# Patient Record
Sex: MALE | Race: BLACK OR AFRICAN AMERICAN | NOT HISPANIC OR LATINO | Employment: UNEMPLOYED | URBAN - METROPOLITAN AREA
[De-identification: names, ages, dates, MRNs, and addresses within clinical notes are randomized per-mention and may not be internally consistent; named-entity substitution may affect disease eponyms.]

---

## 2021-05-04 ENCOUNTER — PATIENT MESSAGE (OUTPATIENT)
Dept: RESEARCH | Facility: HOSPITAL | Age: 46
End: 2021-05-04

## 2021-07-01 ENCOUNTER — PATIENT MESSAGE (OUTPATIENT)
Dept: ADMINISTRATIVE | Facility: OTHER | Age: 46
End: 2021-07-01

## 2025-02-18 ENCOUNTER — TELEPHONE (OUTPATIENT)
Dept: ORTHOPEDICS | Facility: CLINIC | Age: 50
End: 2025-02-18
Payer: MEDICAID

## 2025-02-18 NOTE — TELEPHONE ENCOUNTER
Pt wife was calling in regards to pt referral from [facility]. I explained to her that I have re faxed his referral back since December explaining that Radha can not see him since it is for back pain and Dr. Burris wanting him to see a hip surgeon since he has bone on bone but I could help him get scheduled with our hip surgeon Sohan or Tamara and our pain management doctor that we have here. She was very understanding and said they are willing to travel to see Dr. Trivedi. I offered them next available with Dr. Trivedi in [place] for April 29th and with Dr. Rudolph this Thursday. She accepted and will get his records from [facility].

## 2025-02-20 ENCOUNTER — HOSPITAL ENCOUNTER (OUTPATIENT)
Dept: RADIOLOGY | Facility: HOSPITAL | Age: 50
Discharge: HOME | End: 2025-02-20
Attending: ANESTHESIOLOGY
Payer: MEDICAID

## 2025-02-20 ENCOUNTER — OFFICE VISIT (OUTPATIENT)
Dept: PAIN MEDICINE | Facility: CLINIC | Age: 50
End: 2025-02-20
Payer: MEDICAID

## 2025-02-20 VITALS
DIASTOLIC BLOOD PRESSURE: 102 MMHG | WEIGHT: 285 LBS | SYSTOLIC BLOOD PRESSURE: 167 MMHG | HEIGHT: 75 IN | OXYGEN SATURATION: 96 % | HEART RATE: 68 BPM | BODY MASS INDEX: 35.43 KG/M2

## 2025-02-20 DIAGNOSIS — M87.051 AVASCULAR NECROSIS OF RIGHT FEMORAL HEAD: ICD-10-CM

## 2025-02-20 DIAGNOSIS — M54.16 LUMBAR RADICULITIS: Primary | ICD-10-CM

## 2025-02-20 DIAGNOSIS — M54.16 LUMBAR RADICULITIS: ICD-10-CM

## 2025-02-20 DIAGNOSIS — M48.56XS COMPRESSION FRACTURE OF LUMBAR SPINE, NON-TRAUMATIC, SEQUELA: ICD-10-CM

## 2025-02-20 DIAGNOSIS — M47.816 LUMBAR FACET ARTHROPATHY: ICD-10-CM

## 2025-02-20 DIAGNOSIS — M87.052 AVASCULAR NECROSIS OF LEFT FEMORAL HEAD: ICD-10-CM

## 2025-02-20 PROCEDURE — 72114 X-RAY EXAM L-S SPINE BENDING: CPT | Mod: TC

## 2025-02-20 PROCEDURE — 73521 X-RAY EXAM HIPS BI 2 VIEWS: CPT | Mod: TC

## 2025-02-20 PROCEDURE — 99214 OFFICE O/P EST MOD 30 MIN: CPT | Mod: PBBFAC,25 | Performed by: ANESTHESIOLOGY

## 2025-02-20 RX ORDER — TIZANIDINE 4 MG/1
4 TABLET ORAL 3 TIMES DAILY
COMMUNITY
Start: 2024-12-20

## 2025-02-20 RX ORDER — PREGABALIN 200 MG/1
200 CAPSULE ORAL 2 TIMES DAILY
COMMUNITY
Start: 2025-02-12 | End: 2025-03-14

## 2025-02-20 NOTE — PROGRESS NOTES
New Patient Evaluation  [facility]    Sadi Mathews  : 1975  Date: 2025     CHIEF COMPLAINT:  No chief complaint on file.    Referring Physician: Self, Aaareferral  Primary Care Physician: Marlyn Hall NP    HPI:  This is a 50 y.o. male with a chief complaint of No chief complaint on file.  . The patient has Past medical history/Past surgical history of ***    Patient was evaluated and referred by ***    Diabetic: {GAYes/No/NA:09486}    {Anticoagulation medications:75360}    Allergy To Iodine: {GAYes/No/NA:79823}    Currently on Antibiotic: {GAYes/No/NA:44759}    Current Description of Pain Symptoms:    History of Recent Fall or Trauma: {GAYes/No/NA:42297}   Onset: Chronic, started ***  Pain Location: ***  Radiates/associated symptoms: ***.   Pain is Getting worse over the last *** months    The pain is described as {Desc; pain character:67097}.   Exacerbating factors: {Causes; Pain:18631}.   Mitigating factors ***.   Symptoms interfere with daily activity, sleeping, and ***.   The patient feels like symptoms have been {IUW:46708}.   Patient {Denies / Reports:45170} {RED FLAGS:41659}.    Pain score:   Current: {PAIN 0-10:28365}/10  Best: {PAIN 0-10:76201}/10  Worst: {PAIN 0-10:34482}/10    Current pain medications:  Current Medications[1]    Current Narcotics/Opioid /benzo Medications:  Opioids- {GAopioid:03903}  Benzodiazepines: {GAYes/No/NA:53392}    UDS:  NA    PDMP:  {:81383}     Previous Chronic Pain Treatment History:  Six weeks of conservative therapy include: Physical Therapy/HEP/Physician Lead Exercise Program:  Over the past 12 months, Patient has done  *** sessions.  PT response: {PT response:44452} Helpful.   Dates of the PT sessions: ***, ***.  Is patient actively participating in home exercise program (HEP)/ physician led exercise program in the last 6 months: {GAYes/No/NA:87107}.    Non-interventional Pain Therapy:  []Chiropractor.   []Acupuncture/Dry  "needle.  []TENS unit.  []Heat/ICE.  []Back Brace.    Medications previously tried:  NSAIDs: {GANSIAD:20764}  Topical Agent: {GAYes/No/NA:44793}  TCA/SSRI/SNRI: {GATCA/SSRI/SNRI:54872}  Anti-convulsants: {GAAnticonvulsants:70740}  Muscle Relaxants: {GAmuscle Relaxant:99478}  Opioids- {GAopioid:56218}.    Interventional Pain Procedures:  ***    Previous spine/Relevant joint surgery:  ***  Surgical History:   has a past surgical history that includes Left knee sx and Appendectomy.  Medical History:   has a past medical history of Gout and Hypertension.  Family History:  family history includes Heart disease in his mother; Hypertension in his mother.  Allergies:  Patient has no known allergies.   Social History/SUBSTANCE ABUSE HISTORY:  Personal history of substance abuse: No   reports that he has never smoked. He has never used smokeless tobacco. He reports current alcohol use. He reports that he does not use drugs.  LABS:  CBC  Lab Results   Component Value Date    WBC 4.00 02/26/2015    HGB 15.0 02/26/2015    HCT 43.2 02/26/2015     Coagulation Profile   Lab Results   Component Value Date     02/26/2015     No results found for: "PT", "PTT", "INR"  CMP:  BMP  Lab Results   Component Value Date     09/01/2016    K 4.1 09/01/2016     09/01/2016    CO2 27 09/01/2016    BUN 11 09/01/2016    CREATININE 1.2 09/01/2016    CALCIUM 9.0 09/01/2016    ANIONGAP 7 (L) 09/01/2016     Lab Results   Component Value Date    ALT 46 (H) 09/01/2016    AST 39 09/01/2016    ALKPHOS 97 09/01/2016    BILITOT 0.9 09/01/2016     HGBA1C:  Lab Results   Component Value Date    HGBA1C 5.7 09/01/2016       ROS:    Review of Systems   GENERAL:  No weight loss, malaise or fevers.  HEENT:   No recent changes in vision or hearing  NECK:  Negative for lumps, no difficulty with swallowing.  RESPIRATORY:  Negative for cough, wheezing or shortness of breath, patient denies any recent URI.  CARDIOVASCULAR:  Negative for chest pain or " palpitations.  GI:  Negative for abdominal discomfort, blood in stools or black stools or change in bowel habits.  MUSCULOSKELETAL:  See HPI.  SKIN:  Negative for lesions, rash, and itching.  PSYCH:  No mood disorder or recent psychosocial stressors.   HEMATOLOGY/LYMPHOLOGY:  See the blood thinner sectioned in HPI.  NEURO:  See HPI  All other reviewed and negative other than HPI.    PHYSICAL EXAM:  VITALS: There were no vitals taken for this visit.  There is no height or weight on file to calculate BMI.  GENERAL: Well appearing, in no acute distress, alert and oriented x3, answers questions appropriately.   PSYCH: Flat affect.  SKIN: Skin color, texture, turgor normal, no rashes or lesions.  HEAD/FACE:  Normocephalic, atraumatic. Cranial nerves grossly intact.  CV: Regular rate  PULM: No evidence of respiratory difficulty, symmetric chest rise.  GI:  Soft and non-Distended.  NECK: ({GA+:44301}) pain to palpation over the cervical paraspinous muscles. Spurling:{GA+:68322}. ({GA+:47638}) pain with neck flexion, extension, or lateral flexion, Muscle strength in RT UE ***/5 and Left UE ***/5, Hand  ***/5, left Hand ***/5  BACK/SIJ/HIP:  Lumbar Spine Exam:       Inspection: No erythema, bruising.       Palpation: ({GA+:71043}) TTP of lumbar paraspinals bilaterally      ROM:  Limited in flexion, extension, lateral bending.       ({GA+:26105}) Facet loading {GAHip:07421}      ({GA+:49878}) Straight Leg Raise, {GAHip:99301}      ({GA+:92946}) CEASAR, Tenderness over the PSIS, Yeoman test, {GAHip:42074}  Hip Exam:      Inspection: No gross deformity or apparent leg length discrepancy      Palpation:  No TTP to bilateral greater trochanteric bursas.       ROM:  *** limitation Due to pain in internal rotation, external rotation b/l  Neurologic Exam:     Alert. Speech is fluent and appropriate.     Strength: ***/5 in {GAHip:68875} hip flexion and knee extension     Sensation:  Grossly intact to light touch in bilateral  "lower extremities     Tone: No abnormality appreciated in bilateral lower extremities  Knee exam:  No gross deformity or apparent leg length discrepancy, positive tenderness over the anteromedial aspect of the knee cap, positive limitation due to pain in flexion and extension, sensation  grossly intact to light touch in bilateral lower extremity, no atrophy or tone abnormalities    GAIT: {GAgait:59083}    DIAGNOSTIC STUDIES AND MEDICAL RECORDS REVIEW:  I have personally reviewed and interpreted relevant radiology reports and reviewed relevant records from other services in the EMR.   ***  Clinical Impression:  This is a pleasant 50 y.o. male patient with PMH/PSH of ***, presenting with***.     We discussed the underlying diagnoses and multiple treatment options including non-opioid medications, interventional procedures, physical therapy, home exercise, core muscle enhancement, and weight loss.  The risks and benefits of each treatment option were discussed and all questions were answered.      Encounter Diagnosis:  Sadi Mathews is a 50 y.o. male with the following diagnoses based on history, exam, and imaging:  There are no diagnoses linked to this encounter.     Treatment Plan:    Diagnostics/Referrals: {gaimage:60855}    Medications:    NSAIDs: {GANSIAD:91890}  Topical Agent: {GAYes/No/NA:15509}  TCA/SSRI/SNRI: {GATCA/SSRI/SNRI:54508}  Anti-convulsants: {GAAnticonvulsants:42585}  Muscle Relaxants: {GAmuscle Relaxant:46374}  Opioids: {GAopioid:56722::"None"}  Patient was educated about the risk and benefit of chronic opioid therapy including dependency, addiction, diversion, and opioid hyperalgesia.  Interventional Therapy: {GAProcedure:76968}.  Sedation: {GAsedation:79652}.  {Anticoagulation medications:60385} -Clearance to stop Blood thinner: {GAYes/No/NA:32361}    Regarding the above interventions, the patient has been educated regarding the risks (including bleeding, infection, increased pain, nerve damage, " or allergic reaction), benefits, and alternatives. The patient states he understands and is eager to proceed.    Physical Rehabilitation: {GAPT:34489}    Patient Education: Counseled patient regarding the importance of {:84969}, I have stressed the importance of physical activity and a home exercise plan to help with pain and improve health.    Follow-up: RTC ***.    May consider:     I would like to thank Self, Aaareferral for the opportunity to assist in the care of this patient. We had a very nice visit and I look forward to continuing their care. Please let me know if I can be of further assistance.     Malgorzata Rudolph MD  Anesthesiologist  Interventional Pain Medicine  02/20/2025    Disclaimer:  This note was prepared using voice recognition system and is likely to have sound alike errors that may have been overlooked even after proof reading.  Please call me with any questions.         [1]    Current Outpatient Medications:     allopurinol (ZYLOPRIM) 300 MG tablet, Take 1 tablet (300 mg total) by mouth once daily. (Patient taking differently: Take 100 mg by mouth once daily.), Disp: 30 tablet, Rfl: 11    diclofenac (VOLTAREN) 75 MG EC tablet, Take 75 mg by mouth 2 (two) times daily., Disp: , Rfl:     HYDROcodone-acetaminophen (NORCO) 5-325 mg per tablet, Take 1 tablet by mouth every 8 (eight) hours as needed for Pain. (Patient not taking: Reported on 5/20/2024), Disp: 12 tablet, Rfl: 0    indomethacin (INDOCIN) 25 MG capsule, Take 1 capsule (25 mg total) by mouth 3 (three) times daily as needed., Disp: 30 capsule, Rfl: 1    lisinopril 10 MG tablet, Take 1 tablet (10 mg total) by mouth once daily., Disp: 30 tablet, Rfl: 11    methocarbamoL (ROBAXIN) 750 MG Tab, Take 750 mg by mouth 2 (two) times a day., Disp: , Rfl:     naproxen (NAPROSYN) 500 MG tablet, Take 1 tablet (500 mg total) by mouth every 12 (twelve) hours as needed (pain)., Disp: 30 tablet, Rfl: 0

## 2025-02-20 NOTE — PROGRESS NOTES
New Patient Evaluation  [facility]    Sadi Mathews  : 1975  Date: 2025     CHIEF COMPLAINT:  Low-back Pain, Leg Pain, Hip Pain, and Headache    [facility] Physician: [facility]  Primary Care Physician: Marlyn Hall NP    HPI:  This is a 50 y.o. male with a chief complaint of Low-back Pain, Leg Pain, Hip Pain, and Headache  . The patient has Past medical history/Past surgical history of  gout, hypertension    Patient was  self-referred for low back pain, bilateral hip pain and bilateral lower extremity radiation   Patient has completed MRI lumbar spine outside [facility] in addition to EMG/nerve conduction study  He is scheduled to see orthopedic provider for bilateral hip avascular necrosis    Diabetic: No    Anticoagulation medications: None    Allergy To Iodine: No    Currently on Antibiotic: No    Current Description of Pain Symptoms:    History of Recent Fall or Trauma: Yes 2025  Onset: Chronic, started About 1.5 years ago  Pain Location: lower back, Bl Hips  Radiates/associated symptoms: BL LE RT>LT to the feet.   Pain is Getting worse over the last 3 months    The pain is described as aching, burning, numbing, sharp, shooting, stabbing, throbbing, and tingling.   Exacerbating factors: Sitting, Standing, Laying, Walking, Night Time, Morning, and Lifting.   Mitigating factors moving.   Symptoms interfere with daily activity, sleeping.   The patient feels like symptoms have been worsening.   Patient denies night fever/night sweats, urinary incontinence, bowel incontinence, significant weight loss, and loss of sensations.  BLE weakness, legs give out, he used cane all the time.    Pain score:   Current: 8/10  Best: 8/10  Worst: 8/10    Current pain medications:  Tizanidine 4 mg, PRN  Lyrica 200mg, BID  Naproxen 500 mg, PRN  Diclofenac 75 mg, BID  [id number] mg, BID    Current Narcotics/Opioid /benzo Medications:  Opioids- None  Benzodiazepines:  "No    UDS:  NA    PDMP:  Reviewed and consistent with medication use as prescribed.     Previous Chronic Pain Treatment History:  Six weeks of conservative therapy include: Physical Therapy/HEP/Physician Lead Exercise Program:  Over the past 12 months, Patient has done  0 sessions.    Is patient actively participating in home exercise program (HEP)/ physician led exercise program in the last 6 months: Yes.    Non-interventional Pain Therapy:  []Chiropractor.   []Acupuncture/Dry needle.  []TENS unit.  [x]Heat/ICE.  []Back Brace.    Medications previously tried:  NSAIDs: Ibuprofen (Advil/Motrin), Naproxen (Naprosyn), and Diclofenac  Topical Agent: Yes  TCA/SSRI/SNRI: None  Anti-convulsants: Lyrica   Muscle Relaxants: Robaxin (methocarbamol )  and Tizanidine (Zanaflex)  Opioids- Oxycodone with Acetaminophen (Percocet) and Hydrocodone with Acetaminophen (Norco).    Interventional Pain Procedures:  N/A    Previous spine/Relevant joint surgery:  N/A  Surgical History:   has a past surgical history that includes Left knee sx and Appendectomy.  Medical History:   has a past medical history of Gout and Hypertension.  Family History:  family history includes Heart disease in his mother; Hypertension in his mother.  Allergies:  Patient has no known allergies.   Social History/SUBSTANCE ABUSE HISTORY:  Personal history of substance abuse: No   reports that he has never smoked. He has never used smokeless tobacco. He reports current alcohol use. He reports that he does not use drugs.  LABS:  CBC  Lab Results   Component Value Date    WBC 4.00 02/26/2015    HGB 15.0 02/26/2015    HCT 43.2 02/26/2015     Coagulation Profile   Lab Results   Component Value Date     02/26/2015     No results found for: "PT", "PTT", "INR"  CMP:  BMP  Lab Results   Component Value Date     09/01/2016    K 4.1 09/01/2016     09/01/2016    CO2 27 09/01/2016    BUN 11 09/01/2016    CREATININE 1.2 09/01/2016    CALCIUM 9.0 09/01/2016    " "ANIONGAP 7 (L) 09/01/2016     Lab Results   Component Value Date    ALT 46 (H) 09/01/2016    AST 39 09/01/2016    ALKPHOS 97 09/01/2016    BILITOT 0.9 09/01/2016     HGBA1C:  Lab Results   Component Value Date    HGBA1C 5.7 09/01/2016       ROS:    Review of Systems   GENERAL:  No weight loss, malaise or fevers.  HEENT:   No recent changes in vision or hearing  NECK:  Negative for lumps, no difficulty with swallowing.  RESPIRATORY:  Negative for cough, wheezing or shortness of breath, patient denies any recent URI.  CARDIOVASCULAR:  Negative for chest pain or palpitations.  GI:  Negative for abdominal discomfort, blood in stools or black stools or change in bowel habits.  MUSCULOSKELETAL:  See HPI.  SKIN:  Negative for lesions, rash, and itching.  PSYCH:  No mood disorder or recent psychosocial stressors.   HEMATOLOGY/LYMPHOLOGY:  See the blood thinner sectioned in HPI.  NEURO:  See HPI  All other reviewed and negative other than HPI.    PHYSICAL EXAM:  VITALS: BP (!) 167/102 (BP Location: Left arm, Patient Position: Sitting)   Pulse 68   Ht 6' 3" (1.905 m)   Wt 129.3 kg (285 lb)   SpO2 96%   BMI 35.62 kg/m²   Body mass index is 35.62 kg/m².  GENERAL: Well appearing, in no acute distress, alert and oriented x3, answers questions appropriately.   PSYCH: Flat affect.  SKIN: Skin color, texture, turgor normal, no rashes or lesions.  HEAD/FACE:  Normocephalic, atraumatic. Cranial nerves grossly intact.  CV: Regular rate  PULM: No evidence of respiratory difficulty, symmetric chest rise.  GI:  Soft and non-Distended.    BACK/SIJ/HIP:  Lumbar Spine Exam:       Inspection: No erythema, bruising.       Palpation: (+++) TTP of lumbar paraspinals bilaterally      ROM:  Limited in flexion, extension, lateral bending.       (+++) Facet loading bilateral      (NA) Straight Leg Raise      (NA) CEASAR, Tenderness over the PSIS, Yeoman test  Hip Exam:      Inspection: No gross deformity or apparent leg length discrepancy      " Palpation:  No TTP to bilateral greater trochanteric bursas.       ROM:  + limitation Due to pain in internal rotation, external rotation b/l  Neurologic Exam:     Alert. Speech is fluent and appropriate.     Strength: 3/5 in left hip flexion and knee extension     Sensation:  Grossly intact to light touch in bilateral lower extremities     Tone: No abnormality appreciated in bilateral lower extremities    GAIT: walking with the assistance using cane    DIAGNOSTIC STUDIES AND MEDICAL RECORDS REVIEW:  I have personally reviewed and interpreted relevant radiology reports and reviewed relevant records from other services in the EMR.       -  x-ray lumbar spine 2025  Alignment: Mild levo scoliotic curvature.  Mild retrolisthesis of L1 on L2, L2 on L3 and L3 on L4. There does appear to be some mild movement at these levels on flexion and extension imaging concerning for ligamentous instability.     Vertebrae: There is some chronic compression deformity of the L3, L4 and L5 vertebra, unchanged from 2022. No suspicious appearing lytic or blastic lesions.     Discs and facets: There is disc space narrowing with endplate sclerosis at the L3-4 and L4-5 levels.  Facet arthropathy at L4-5 and L5-S1.    Clinical Impression:  This is a pleasant 50 y.o. male patient with PMH/PSH of gout, hypertension, presenting with low back pain, bilateral hip pain, bilateral lower extremity radiation, bilateral lower extremity weakness, patient is very antalgic with a unsteady gait, he is using the cane all the time, he is pending to be seen by orthopedic team for avascular necrosis of bilateral femoral head.     Encounter Diagnosis:  Sadi Mathews is a 50 y.o. male with the following diagnoses based on history, exam, and imagin. Lumbar radiculitis  - X-Ray Lumbar Complete Including Flex And Ext; Future  - X-Ray Hips Bilateral 2 View Inc AP Pelvis; Future    2. Lumbar facet arthropathy    3. Avascular necrosis of left femoral  head    4. Avascular necrosis of right femoral head    5. Compression fracture of lumbar spine, non-traumatic, sequela     Treatment Plan:    Diagnostics/Referrals: pending receiving records for SNC: MRI lumbar spine and EMG    Medications:    Tizanidine 4 mg, PRN  Lyrica 200mg, BID  Naproxen 500 mg, PRN  Diclofenac 75 mg, BID  [id number] mg, BID    Interventional Therapy: Procedure based on MRI images.  Sedation: NA.  Anticoagulation medications: None -Clearance to stop Blood thinner: NA      Physical Rehabilitation:  continue with a home exercise    Follow-up: RTC  to discuss MRI lumbar spine.    May consider: BL HIP injection     Malgorzata Rudolph MD  Anesthesiologist  Interventional Pain Medicine  02/20/2025    Disclaimer:  This note was prepared using voice recognition system and is likely to have sound alike errors that may have been overlooked even after proof reading.  Please call me with any questions.

## 2025-04-29 ENCOUNTER — TELEPHONE (OUTPATIENT)
Dept: ORTHOPEDICS | Facility: CLINIC | Age: 50
End: 2025-04-29

## 2025-04-29 ENCOUNTER — OFFICE VISIT (OUTPATIENT)
Dept: ORTHOPEDICS | Facility: CLINIC | Age: 50
End: 2025-04-29
Payer: MEDICAID

## 2025-04-29 VITALS — BODY MASS INDEX: 35.62 KG/M2 | HEIGHT: 75 IN

## 2025-04-29 DIAGNOSIS — M87.052 AVASCULAR NECROSIS OF BONES OF BOTH HIPS: Primary | ICD-10-CM

## 2025-04-29 DIAGNOSIS — Z96.642 S/P TOTAL LEFT HIP ARTHROPLASTY: Primary | ICD-10-CM

## 2025-04-29 DIAGNOSIS — M87.051 AVASCULAR NECROSIS OF BONES OF BOTH HIPS: Primary | ICD-10-CM

## 2025-04-29 PROCEDURE — 99999 PR PBB SHADOW E&M-EST. PATIENT-LVL III: CPT | Mod: PBBFAC,,, | Performed by: ORTHOPAEDIC SURGERY

## 2025-04-29 PROCEDURE — 1160F RVW MEDS BY RX/DR IN RCRD: CPT | Mod: CPTII,,, | Performed by: ORTHOPAEDIC SURGERY

## 2025-04-29 PROCEDURE — 99204 OFFICE O/P NEW MOD 45 MIN: CPT | Mod: S$PBB,,, | Performed by: ORTHOPAEDIC SURGERY

## 2025-04-29 PROCEDURE — 99213 OFFICE O/P EST LOW 20 MIN: CPT | Mod: PBBFAC,PN | Performed by: ORTHOPAEDIC SURGERY

## 2025-04-29 PROCEDURE — 1159F MED LIST DOCD IN RCRD: CPT | Mod: CPTII,,, | Performed by: ORTHOPAEDIC SURGERY

## 2025-04-29 PROCEDURE — 3008F BODY MASS INDEX DOCD: CPT | Mod: CPTII,,, | Performed by: ORTHOPAEDIC SURGERY

## 2025-04-29 NOTE — PROGRESS NOTES
Subjective:      Patient ID: Sadi Mathews is a 50 y.o. male.    Chief Complaint: Consult (Hip pain )    HPI      Sadi Mathews is seen for evaluation and treatment of hip pain.  They have experienced problems with their bilateral hip over the past about 1 year Pain is located in the groin  and referred to the knee. They have been treated with  Norco, NSAIDS, cane/walker, and activity modification. Symptoms have recently worsened. Ambulation reportedly has been impaired. Self care ADLs are painful.  The patient reports that there is alternation between each side with regards to which 1 feels worse.    The patient has a history of intermittent use of prednisone gout and he reports regular alcohol consumption.    A documented history of lumbosacral degenerative disease is also noted    Review of Systems   Constitutional: Negative for fever and weight loss.   HENT:  Negative for congestion.    Eyes:  Negative for visual disturbance.   Cardiovascular:  Negative for chest pain.   Respiratory:  Negative for shortness of breath.    Hematologic/Lymphatic: Negative for bleeding problem. Does not bruise/bleed easily.   Skin:  Negative for poor wound healing.   Musculoskeletal:  Positive for back pain and joint pain.   Gastrointestinal:  Negative for abdominal pain.   Genitourinary:  Negative for dysuria.   Neurological:  Negative for seizures.   Psychiatric/Behavioral:  Negative for altered mental status.    Allergic/Immunologic: Negative for persistent infections.         Objective:            Ortho/SPM Exam      Left hip    The patient is not in acute distress.   Body habitus is:normal.   Sclerae normal  The patient walks with a limp.   Respiratory distress:  none  The skin over the hip is:intact.   There is:no local tenderness.   Range of motion- Flexion 90, External rotation 30, internal rotation 10.  Resisted SLR positive.  Pain with rotation positive  Sciatic tension findings negative.  Shortening/lengthening  compared to the contralateral side exam deferred.  Pulses DP present, PT present.  Motor normal 5/5 strength in all tested muscle groups.   Sensory normal.    Right hip     Slightly arrange of motion all planes, otherwise identical    IMAGING- radiographs the left hip show avascular necrosis with collapse and degeneration, Grace stage 5.  Radiographs the right hip show avascular necrosis with a crescent sign and likely early collapse, Grace stage IV              Assessment:       Encounter Diagnosis   Name Primary?    Avascular necrosis of bones of both hips Yes            Although the patient has fairly symmetrical symptomatology, the left side is structurally much worse.  The patient has significant pain and functional impairment bilaterally.  Both sides have progression to the point where the only intervention likely to be of meaningful benefit is arthroplasty.        Plan:       Sadi was seen today for consult.    Diagnoses and all orders for this visit:    Avascular necrosis of bones of both hips          I explained my diagnostic impression and the reasoning behind it in detail, using layman's terms.  Models and/or pictures were used to help in the explanation.    The process of left total hip replacement was explained to the patient.  The nature of the procedure was explained using a model.  The expected perioperative clinical course and period of recovery as applicable to the patient's condition was described.  The risks including death, infection, thromboembolic events, instability, leg length discrepancy, persistent pain/stiffness, fracture around implant and implant failure due to wear or loosening, with possible need for reoperation, were all explained.  The possibility and expectations of continued nonsurgical care were reviewed.  The patient understands and wishes to proceed with the recommended operation.    The right side can be addressed after the patient feels sufficiently recovered from  the left.    In the future, ongoing management of gout and lumbar degenerative problems will be needed in order to maximize the patient has functional capacity and comfort.  I explained this.

## 2025-05-14 ENCOUNTER — TELEPHONE (OUTPATIENT)
Dept: ORTHOPEDICS | Facility: CLINIC | Age: 50
End: 2025-05-14
Payer: MEDICAID

## 2025-05-14 DIAGNOSIS — M87.052 AVASCULAR NECROSIS OF BONE OF LEFT HIP: Primary | ICD-10-CM

## 2025-05-14 DIAGNOSIS — Z01.818 PRE-OP EXAM: ICD-10-CM

## 2025-05-19 ENCOUNTER — OFFICE VISIT (OUTPATIENT)
Dept: ORTHOPEDICS | Facility: CLINIC | Age: 50
End: 2025-05-19
Payer: MEDICAID

## 2025-05-19 DIAGNOSIS — M87.052 AVASCULAR NECROSIS OF BONE OF LEFT HIP: Primary | ICD-10-CM

## 2025-05-19 DIAGNOSIS — Z96.642 S/P TOTAL LEFT HIP ARTHROPLASTY: ICD-10-CM

## 2025-05-19 DIAGNOSIS — Z01.818 PRE-OP EXAM: ICD-10-CM

## 2025-05-19 PROCEDURE — 99213 OFFICE O/P EST LOW 20 MIN: CPT | Mod: PBBFAC,PN

## 2025-05-19 PROCEDURE — 1159F MED LIST DOCD IN RCRD: CPT | Mod: CPTII,,,

## 2025-05-19 PROCEDURE — 3044F HG A1C LEVEL LT 7.0%: CPT | Mod: CPTII,,,

## 2025-05-19 PROCEDURE — 1160F RVW MEDS BY RX/DR IN RCRD: CPT | Mod: CPTII,,,

## 2025-05-19 PROCEDURE — 99999 PR PBB SHADOW E&M-EST. PATIENT-LVL III: CPT | Mod: PBBFAC,,,

## 2025-05-19 PROCEDURE — 99214 OFFICE O/P EST MOD 30 MIN: CPT | Mod: S$PBB,,,

## 2025-05-19 NOTE — PROGRESS NOTES
Subjective:      Patient ID: Sadi Mathews is a 50 y.o. male.    Chief Complaint: Left hip pain     HPI: Sadi Mathews is a 50 y.o. male who presents to the clinic today for a patient optimization visit in preparation for a left total hip arthroplasty to be performed by Dr. Trivedi on 06/02/2025.  He has a significant history of left hip pain.  Pain is worse with activity and weight bearing.  Patient has experienced interference of ADLs due to increased pain and decreased range of motion. Patient has failed non-operative treatment including NSAIDs, activity modification, weight loss, and corticosteroid injections. Sadi Mathews ambulates with a cane. He was last seen and treated in the clinic on 4/29/2025. There has been no significant change in medical status since last visit.    Ambulating: with a cane  Diabetic:  No  Smoking:  He has never smoked.  History of DVT/PE: Negative      PMHx significant for:         - Gout        - Hypertension           PAST MEDICAL HISTORY:    Past Medical History:   Diagnosis Date    Gout     Hypertension      PAST SURGICAL HISTORY:    Past Surgical History:   Procedure Laterality Date    APPENDECTOMY      Left knee sx       FAMILY HISTORY:    Family History   Problem Relation Name Age of Onset    Heart disease Mother      Hypertension Mother       SOCIAL HISTORY:    Social History     Occupational History    Not on file   Tobacco Use    Smoking status: Never    Smokeless tobacco: Never   Substance and Sexual Activity    Alcohol use: Yes     Comment: occ    Drug use: No    Sexual activity: Not on file      MEDICATIONS: Current Medications[1]  ALLERGIES: Review of patient's allergies indicates:  No Known Allergies    Review of Systems:  Constitution: Negative for chills, fever and night sweats.   HENT: Negative for congestion and headaches.    Eyes: Negative for blurred vision or vision loss.  Cardiovascular: Negative for chest pain and syncope.   Respiratory: Negative for cough  and shortness of breath.    Endocrine: Negative for polydipsia, polyphagia and polyuria.   Hematologic/Lymphatic: Negative for bleeding problem. Does not bruise/bleed easily.   Skin: Negative for dry skin, itching and rash.   Musculoskeletal: See HPI.   Gastrointestinal: Negative for abdominal pain and bowel incontinence.   Genitourinary: Negative for bladder incontinence and nocturia.   Neurological: Negative for disturbances in coordination, loss of balance and seizures.   Psychiatric/Behavioral: Negative for depression. The patient does not have insomnia.    Allergic/Immunologic: Negative for hives and persistent infections.        Objective:      There were no vitals filed for this visit.    PHYSICAL EXAM:  General: Alert & oriented x3, well-developed and well-nourished, in no acute distress, sitting comfortably in the exam room.  Skin: Warm and dry. Capillary refill less than 2 seconds.   Head: Normocephalic and atraumatic.   Eyes: Sclera appear normal.   Nose: No deformities seen.   Ears: No deformities seen.   Neck: No tracheal deviation present.   Pulmonary/Chest: Breathing unlabored. Breath sounds normal.  Cardiovascular: Normal rate and regular rhythm.   Abdominal: Soft and nondistended.  Neurological: Alert and oriented to person, place, and time.   Psychiatric: Mood is pleasant and affect appropriate.     LEFT HIP:        Body habitus is:  normal.         The patient walks with a limp.         Resisted SLR:  positive.        Sciatic tension findings:  negative.        The skin over the hip is intact.        Tenderness:  no local tenderness.        Range of Motion:    Flexion:  90°  External Rotation:  30°  Internal Rotation:  10°        Pain with rotation:  positive        Shortening/lengthening compared to the contralateral side:  exam deferred.        Pulses DP present, PT present.        Motor:  normal 5/5 strength in all tested muscle groups.         Sensory:  normal.      Lab Review:         CBC  from 05/14/2025 reviewed: stable        CMP from 05/14/2025 reviewed: stable        Hemoglobin A1C from 05/14/2025 reviewed: 5.3        Imaging:         Dr. Trivedi previous imaging read: radiographs the left hip show avascular necrosis with collapse and degeneration, Grace stage 5.  Radiographs the right hip show avascular necrosis with a crescent sign and likely early collapse, Grace stage IV        Assessment:       1. Avascular necrosis of bone of left hip    2. Pre-op exam    3. S/P total left hip arthroplasty         Plan:       Orders Placed This Encounter    WALKER FOR HOME USE    Ambulatory referral/consult to Home Health       Left total hip arthroplasty scheduled for  06/02/2025.    Discharge planning was discussed and patient plans to go home the same day of surgery if possible.    Post-op Home Health and DME has been ordered including standard walker.     During today's Patient Optimization Visit all consultant notes, medications, imaging, EKG, and lab work has been reviewed. Discharge planning was discussed and Home Health has been ordered. Any additional testing or consults needed for medical clearance has been ordered. Pre, sarah, and post operative procedures and expectations discussed. All questions were answered.   Sadi Mathews will contact us if there are any questions, concerns, or changes in medical status prior to surgery.    30 minutes were spent on this encounter. This includes face to face time and non-face to face time preparing to see the patient (eg, review of tests), obtaining and/or reviewing separately obtained history, documenting clinical information in the electronic or other health record, independently interpreting results and communicating results to the patient/family/caregiver, or care coordinator.     Walker Justification: The mobility limitation cannot be sufficiently resolved by the use of a cane.   Patient's functional mobility deficit can be sufficiently resolved  with the use of a (Rolling Walker or Walker).  Patient's mobility limitation significantly impairs their ability to participate in one of more activities of daily living.  The use of a (RW or Walker) will significantly improve the patient's ability to participate in MRADLS and the patient will use it on regular basis in the home.     All of the patient's questions were answered and the patient will contact us if they have any questions or concerns in the interim.      Kat Bradford PA-C  [facility]  Orthopedic Surgery    Medical Dictation software was used during the dictation of portions or the entirety of this medical record.  Phonetic or grammatic errors may exist due to the use of this software. For clarification, refer to the author of the document.              [1]   Current Outpatient Medications:     allopurinol (ZYLOPRIM) 300 MG tablet, Take 1 tablet (300 mg total) by mouth once daily. (Patient not taking: Reported on 5/19/2025), Disp: 30 tablet, Rfl: 11    HYDROcodone-acetaminophen (NORCO) 5-325 mg per tablet, Take 1 tablet by mouth every 8 (eight) hours as needed for Pain. (Patient not taking: Reported on 5/20/2024), Disp: 12 tablet, Rfl: 0    indomethacin (INDOCIN) 25 MG capsule, Take 1 capsule (25 mg total) by mouth 3 (three) times daily as needed. (Patient not taking: Reported on 5/19/2025), Disp: 30 capsule, Rfl: 1    lisinopril 10 MG tablet, Take 1 tablet (10 mg total) by mouth once daily., Disp: 30 tablet, Rfl: 11    LYRICA 200 mg Cap, Take 200 mg by mouth 2 (two) times daily., Disp: , Rfl:     meloxicam (MOBIC) 15 MG tablet, Take 1 tablet (15 mg total) by mouth once daily. (Patient not taking: Reported on 5/19/2025), Disp: 30 tablet, Rfl: 1    methocarbamoL (ROBAXIN) 750 MG Tab, Take 750 mg by mouth 2 (two) times a day. (Patient not taking: Reported on 5/19/2025), Disp: , Rfl:     naproxen (NAPROSYN) 500 MG tablet, Take 1 tablet (500 mg total) by mouth every 12 (twelve) hours as needed  (pain). (Patient not taking: Reported on 5/19/2025), Disp: 30 tablet, Rfl: 0    tiZANidine (ZANAFLEX) 4 MG tablet, Take 4 mg by mouth 3 (three) times daily. (Patient not taking: Reported on 5/19/2025), Disp: , Rfl:

## 2025-06-02 PROBLEM — Z96.642 HISTORY OF LEFT HIP REPLACEMENT: Status: ACTIVE | Noted: 2025-06-02

## 2025-06-16 ENCOUNTER — OFFICE VISIT (OUTPATIENT)
Dept: ORTHOPEDICS | Facility: CLINIC | Age: 50
End: 2025-06-16
Payer: MEDICAID

## 2025-06-16 DIAGNOSIS — Z96.642 S/P TOTAL LEFT HIP ARTHROPLASTY: Primary | ICD-10-CM

## 2025-06-16 PROCEDURE — 99213 OFFICE O/P EST LOW 20 MIN: CPT | Mod: PBBFAC,PN

## 2025-06-16 PROCEDURE — 99024 POSTOP FOLLOW-UP VISIT: CPT | Mod: ,,,

## 2025-06-16 PROCEDURE — 1160F RVW MEDS BY RX/DR IN RCRD: CPT | Mod: CPTII,,,

## 2025-06-16 PROCEDURE — 3044F HG A1C LEVEL LT 7.0%: CPT | Mod: CPTII,,,

## 2025-06-16 PROCEDURE — 99999 PR PBB SHADOW E&M-EST. PATIENT-LVL III: CPT | Mod: PBBFAC,,,

## 2025-06-16 PROCEDURE — 1159F MED LIST DOCD IN RCRD: CPT | Mod: CPTII,,,

## 2025-06-16 NOTE — PROGRESS NOTES
Subjective:      Patient ID: Sadi Mathews is a 50 y.o. male.    Chief Complaint: Post-op Follow Up    HPI: Sadi Mathews returns for a 2 week post-op visit following: left total hip arthroplasty (date of surgery 06/02/2025) with Dr. Trivedi. Overall, the patient is doing well with no complaints of fever, chills, nausea, vomiting, SOB, chest pains, or drainage to surgical incision. The patient reports that pain has been managed with medication. He has been working with Home Health physical therapy, and has been progressing with this. The patient reports ambulating easily. Post-operative complaints include: none.       PAST MEDICAL HISTORY:    Past Medical History:   Diagnosis Date    Avascular necrosis of bone of hip, left     Gout     Hypertension     states has not taken lisinopril in years; unsure if ever officially diagnosed w/; no mention of by pcp     MEDICATIONS: Current Medications[1]  ALLERGIES: Review of patient's allergies indicates:  No Known Allergies    Review of Systems:  Constitution: Negative for chills, fever and night sweats.   HENT: Negative for congestion and headaches.    Eyes: Negative for blurred vision or vision loss.  Cardiovascular: Negative for chest pain and syncope.   Respiratory: Negative for cough and shortness of breath.    Endocrine: Negative for polydipsia, polyphagia and polyuria.   Hematologic/Lymphatic: Negative for bleeding problem. Does not bruise/bleed easily.   Skin: Negative for dry skin, itching and rash.   Musculoskeletal: See HPI.   Gastrointestinal: Negative for abdominal pain and bowel incontinence.   Genitourinary: Negative for bladder incontinence and nocturia.   Neurological: Negative for disturbances in coordination, loss of balance and seizures.   Psychiatric/Behavioral: Negative for depression. The patient does not have insomnia.    Allergic/Immunologic: Negative for hives and persistent infections.        Objective:      There were no vitals filed for this  visit.    PHYSICAL EXAM:  General: Alert & oriented x3, well-developed and well-nourished, in no acute distress, sitting comfortably in the exam room.  Skin: Warm and dry. Capillary refill less than 2 seconds.   Head: Normocephalic and atraumatic.   Eyes: Sclera appear normal.   Nose: No deformities seen.   Ears: No deformities seen.   Neck: No tracheal deviation present.   Pulmonary/Chest: Breathing unlabored.   Neurological: Alert and oriented to person, place, and time.   Psychiatric: Mood is pleasant and affect appropriate.       LEFT HIP:        Observation/Inspection:    Surgical incision is well healed with appropriate scar formation.  No redness, warmth, drainage, or other signs of infection.  Mild swelling.         Range of Motion:  painless         Palpation: No tenderness to palpation to bony prominences and soft tissues throughout.         Strength:    Strength not tested today.  Motor:  no muscle wasting or atrophy.         Neurovascular Exam:  Pulses DP present, PT present.  Sensory:  normal.        Imaging:    X-Rays: 3 views of left hip obtained in the Orthopedic clinic today, and independently reviewed, show: Well-positioned, well-fixed left total hip arthroplasty implant without evidence of loosening or complicating process. No acute fracture or dislocation.       Assessment:       1. S/P total left hip arthroplasty        Plan:          2 weeks s/p left total hip arthroplasty    Overall patient is doing well post-operatively.    Aquacel bandage removed today without complication. Incisions are well healed, with no drainage, erythema, or signs of infection.     Wound Care:  Regular wound care explained to the patient.  Okay to wash incision with soap and water and pat to dry.   Medications:    Continue meloxicam as needed for pain.   Continue with OTC Tylenol and/or NSAIDs as needed for pain.   Antibiotics:  None prescribed today.  No evidence of wound infection.  Physical Therapy:  Patient is  ambulating great, do not see a need for referral to formal PT. Continue HEP.  DME:  Okay to begin to wean off of walker.   Activity:  Continue to advance activities as tolerated.  Continue posterior hip precautions.    Pain Management: Ice compress to the affected area 2-3x a day for 15-20 minutes as needed for pain management.      Follow-Up: 4 weeks for 6-week post-op visit.      All of the patient's questions were answered and the patient will contact us if they have any questions or concerns in the interim.        Kat Bradford PA-C  [facility]  Orthopedic Surgery    Medical Dictation software was used during the dictation of portions or the entirety of this medical record.  Phonetic or grammatic errors may exist due to the use of this software. For clarification, refer to the author of the document.             [1]   Current Outpatient Medications:     allopurinol (ZYLOPRIM) 300 MG tablet, Take 1 tablet (300 mg total) by mouth once daily. (Patient not taking: Reported on 5/19/2025), Disp: 30 tablet, Rfl: 11    aspirin (ECOTRIN) 81 MG EC tablet, Take 1 tablet (81 mg total) by mouth once daily., Disp: , Rfl:     LYRICA 200 mg Cap, Take 200 mg by mouth 2 (two) times daily. Not currently taking, Disp: , Rfl:     meloxicam (MOBIC) 15 MG tablet, Take 1 tablet (15 mg total) by mouth once daily. (Patient not taking: Reported on 5/19/2025), Disp: 30 tablet, Rfl: 1    oxyCODONE-acetaminophen (PERCOCET) 7.5-325 mg per tablet, Take 1 tablet by mouth every 6 (six) hours as needed for Pain., Disp: 45 tablet, Rfl: 0    tiZANidine (ZANAFLEX) 4 MG tablet, Take 4 mg by mouth 3 (three) times daily. (Patient not taking: Reported on 5/19/2025), Disp: , Rfl:

## 2025-06-19 DIAGNOSIS — Z96.642 S/P TOTAL LEFT HIP ARTHROPLASTY: Primary | ICD-10-CM

## 2025-06-26 ENCOUNTER — TELEPHONE (OUTPATIENT)
Dept: ORTHOPEDICS | Facility: CLINIC | Age: 50
End: 2025-06-26
Payer: MEDICAID

## 2025-06-26 NOTE — TELEPHONE ENCOUNTER
Spoke with Mr. Mathews. His PT referral was sent but PT informed him they do not take his insurance. Pt was advised to let the office know which company he would like to go forward with. Pt stated he will call back and let us know where to fax the current PT order.

## 2025-07-15 ENCOUNTER — OFFICE VISIT (OUTPATIENT)
Dept: ORTHOPEDICS | Facility: CLINIC | Age: 50
End: 2025-07-15
Payer: MEDICAID

## 2025-07-15 DIAGNOSIS — Z96.642 S/P TOTAL LEFT HIP ARTHROPLASTY: ICD-10-CM

## 2025-07-15 DIAGNOSIS — M87.051 AVASCULAR NECROSIS OF BONES OF BOTH HIPS: Primary | ICD-10-CM

## 2025-07-15 DIAGNOSIS — M87.052 AVASCULAR NECROSIS OF BONES OF BOTH HIPS: Primary | ICD-10-CM

## 2025-07-15 PROCEDURE — 3044F HG A1C LEVEL LT 7.0%: CPT | Mod: CPTII,,, | Performed by: ORTHOPAEDIC SURGERY

## 2025-07-15 PROCEDURE — 99024 POSTOP FOLLOW-UP VISIT: CPT | Mod: ,,, | Performed by: ORTHOPAEDIC SURGERY

## 2025-07-15 PROCEDURE — 99999 PR PBB SHADOW E&M-EST. PATIENT-LVL II: CPT | Mod: PBBFAC,,, | Performed by: ORTHOPAEDIC SURGERY

## 2025-07-15 PROCEDURE — 1159F MED LIST DOCD IN RCRD: CPT | Mod: CPTII,,, | Performed by: ORTHOPAEDIC SURGERY

## 2025-07-15 PROCEDURE — 99212 OFFICE O/P EST SF 10 MIN: CPT | Mod: PBBFAC,PN | Performed by: ORTHOPAEDIC SURGERY

## 2025-07-15 PROCEDURE — 1160F RVW MEDS BY RX/DR IN RCRD: CPT | Mod: CPTII,,, | Performed by: ORTHOPAEDIC SURGERY

## 2025-07-15 NOTE — PROGRESS NOTES
Subjective:      Patient ID: Sadi Mathews is a 50 y.o. male.    Chief Complaint: Post-op Evaluation (6 week p/o - left DARIAN )      HPI:  Six weeks postop  The patient is seen for postop follow-up of left  DARIAN.  Pain control has been satisfactory  They feel that they are ambulating easily  Postoperative complaints include:  None at this time    Current Medications[1]  Review of patient's allergies indicates:  No Known Allergies    There were no vitals taken for this visit.    ROS        Objective:    Ortho Exam          Alert, oriented, no acute distress  Sclera-Normal  Respiratory distress-none  Gait:  No limp  Wound:  Cleanly healed  Range of motion:  Painless  Distal perfusion:  Intact  Distal neurologic:  Intact    Imaging:  Postop radiographs show well-positioned well-fixed left hip arthroplasty.  Stage III AVN is noted on the right      Assessment:             1. Avascular necrosis of bones of both hips    2. S/P total left hip arthroplasty        The patient is rehabilitating rapidly from his left hip replacement.  There is no evidence of surgical site complication.  There is risk of progression on the right.        Plan:          No follow-ups on file.    I explained my assessment.    Appropriate progression of general activity and hip precautions was discussed with the left hip.    If there is clinical progression of the right hip AVN, intervention will be considered               [1]   Current Outpatient Medications:     allopurinol (ZYLOPRIM) 300 MG tablet, Take 1 tablet (300 mg total) by mouth once daily., Disp: 30 tablet, Rfl: 11    meloxicam (MOBIC) 15 MG tablet, Take 1 tablet (15 mg total) by mouth once daily., Disp: 30 tablet, Rfl: 1    oxyCODONE-acetaminophen (PERCOCET) 7.5-325 mg per tablet, Take 1 tablet by mouth every 6 (six) hours as needed for Pain., Disp: 45 tablet, Rfl: 0    tiZANidine (ZANAFLEX) 4 MG tablet, Take 4 mg by mouth 3 (three) times daily., Disp: , Rfl:     aspirin (ECOTRIN) 81 MG  EC tablet, Take 1 tablet (81 mg total) by mouth once daily., Disp: , Rfl:     LYRICA 200 mg Cap, Take 200 mg by mouth 2 (two) times daily. Not currently taking, Disp: , Rfl: